# Patient Record
Sex: MALE | Race: WHITE | NOT HISPANIC OR LATINO
[De-identification: names, ages, dates, MRNs, and addresses within clinical notes are randomized per-mention and may not be internally consistent; named-entity substitution may affect disease eponyms.]

---

## 2021-11-08 ENCOUNTER — TELEPHONE (OUTPATIENT)
Dept: SCHEDULING | Age: 31
End: 2021-11-08

## 2021-11-17 ENCOUNTER — OFFICE VISIT (OUTPATIENT)
Dept: SPORTS MEDICINE | Age: 31
End: 2021-11-17

## 2021-11-17 VITALS — HEART RATE: 78 BPM | DIASTOLIC BLOOD PRESSURE: 89 MMHG | HEIGHT: 73 IN | SYSTOLIC BLOOD PRESSURE: 139 MMHG

## 2021-11-17 DIAGNOSIS — M54.42 CHRONIC LEFT-SIDED LOW BACK PAIN WITH LEFT-SIDED SCIATICA: Primary | ICD-10-CM

## 2021-11-17 DIAGNOSIS — G89.29 CHRONIC LEFT-SIDED LOW BACK PAIN WITH LEFT-SIDED SCIATICA: Primary | ICD-10-CM

## 2021-11-17 DIAGNOSIS — M79.652 LEFT THIGH PAIN: ICD-10-CM

## 2021-11-17 PROCEDURE — 72110 X-RAY EXAM L-2 SPINE 4/>VWS: CPT | Performed by: FAMILY MEDICINE

## 2021-11-17 PROCEDURE — 99204 OFFICE O/P NEW MOD 45 MIN: CPT | Performed by: FAMILY MEDICINE

## 2021-11-17 RX ORDER — NAPROXEN 500 MG/1
TABLET ORAL
Qty: 60 TABLET | Refills: 0 | Status: SHIPPED | OUTPATIENT
Start: 2021-11-17

## 2021-11-17 ASSESSMENT — ENCOUNTER SYMPTOMS
STRIDOR: 0
DIARRHEA: 0
SHORTNESS OF BREATH: 0
CONFUSION: 0
CHEST TIGHTNESS: 0
FACIAL ASYMMETRY: 0
VOMITING: 0
DIZZINESS: 0
SPEECH DIFFICULTY: 0
ABDOMINAL PAIN: 0
EYE REDNESS: 0
AGITATION: 0
FEVER: 0
NAUSEA: 0
EYE DISCHARGE: 0
TROUBLE SWALLOWING: 0
CHILLS: 0
COLOR CHANGE: 0

## 2021-11-22 DIAGNOSIS — M54.42 CHRONIC LEFT-SIDED LOW BACK PAIN WITH LEFT-SIDED SCIATICA: Primary | ICD-10-CM

## 2021-11-22 DIAGNOSIS — G89.29 CHRONIC LEFT-SIDED LOW BACK PAIN WITH LEFT-SIDED SCIATICA: Primary | ICD-10-CM

## 2021-11-22 DIAGNOSIS — M79.652 LEFT THIGH PAIN: ICD-10-CM

## 2021-11-29 ENCOUNTER — TELEPHONE (OUTPATIENT)
Dept: SPORTS MEDICINE | Age: 31
End: 2021-11-29

## 2021-11-30 ENCOUNTER — TELEPHONE (OUTPATIENT)
Dept: SCHEDULING | Age: 31
End: 2021-11-30

## 2021-12-01 ENCOUNTER — V-VISIT (OUTPATIENT)
Dept: SPORTS MEDICINE | Age: 31
End: 2021-12-01

## 2021-12-01 DIAGNOSIS — M79.652 LEFT THIGH PAIN: ICD-10-CM

## 2021-12-01 DIAGNOSIS — M54.42 CHRONIC LEFT-SIDED LOW BACK PAIN WITH LEFT-SIDED SCIATICA: Primary | ICD-10-CM

## 2021-12-01 DIAGNOSIS — G89.29 CHRONIC LEFT-SIDED LOW BACK PAIN WITH LEFT-SIDED SCIATICA: Primary | ICD-10-CM

## 2021-12-01 PROCEDURE — 99213 OFFICE O/P EST LOW 20 MIN: CPT | Performed by: FAMILY MEDICINE

## 2022-03-29 ENCOUNTER — TELEPHONE (OUTPATIENT)
Dept: SCHEDULING | Age: 32
End: 2022-03-29

## 2022-04-05 ENCOUNTER — OFFICE VISIT (OUTPATIENT)
Dept: SPORTS MEDICINE | Age: 32
End: 2022-04-05

## 2022-04-05 VITALS — DIASTOLIC BLOOD PRESSURE: 87 MMHG | HEART RATE: 85 BPM | SYSTOLIC BLOOD PRESSURE: 137 MMHG

## 2022-04-05 DIAGNOSIS — G89.29 CHRONIC LEFT-SIDED LOW BACK PAIN WITH LEFT-SIDED SCIATICA: Primary | ICD-10-CM

## 2022-04-05 DIAGNOSIS — G57.92 MONONEURITIS OF LEFT LOWER EXTREMITY: ICD-10-CM

## 2022-04-05 DIAGNOSIS — M79.652 LEFT THIGH PAIN: ICD-10-CM

## 2022-04-05 DIAGNOSIS — M54.42 CHRONIC LEFT-SIDED LOW BACK PAIN WITH LEFT-SIDED SCIATICA: Primary | ICD-10-CM

## 2022-04-05 PROCEDURE — 99213 OFFICE O/P EST LOW 20 MIN: CPT | Performed by: FAMILY MEDICINE

## 2022-04-05 ASSESSMENT — ENCOUNTER SYMPTOMS
CHILLS: 0
DIZZINESS: 0
STRIDOR: 0
COLOR CHANGE: 0
FEVER: 0
TROUBLE SWALLOWING: 0
AGITATION: 0
DIARRHEA: 0
CONFUSION: 0
CHEST TIGHTNESS: 0
EYE REDNESS: 0
EYE DISCHARGE: 0
NAUSEA: 0
ABDOMINAL PAIN: 0
SPEECH DIFFICULTY: 0
VOMITING: 0
FACIAL ASYMMETRY: 0
SHORTNESS OF BREATH: 0

## 2022-04-29 ENCOUNTER — HOSPITAL ENCOUNTER (OUTPATIENT)
Dept: NEUROLOGY | Age: 32
Discharge: HOME | End: 2022-04-29
Attending: FAMILY MEDICINE

## 2022-04-29 DIAGNOSIS — G57.92 MONONEURITIS OF LEFT LOWER EXTREMITY: ICD-10-CM

## 2022-04-29 DIAGNOSIS — M79.652 LEFT THIGH PAIN: ICD-10-CM

## 2022-04-29 PROCEDURE — 95908 NRV CNDJ TST 3-4 STUDIES: CPT

## 2022-04-29 PROCEDURE — 95886 MUSC TEST DONE W/N TEST COMP: CPT | Performed by: PSYCHIATRY & NEUROLOGY

## 2022-04-29 PROCEDURE — 95886 MUSC TEST DONE W/N TEST COMP: CPT

## 2022-04-29 PROCEDURE — 95908 NRV CNDJ TST 3-4 STUDIES: CPT | Performed by: PSYCHIATRY & NEUROLOGY

## 2022-05-10 ENCOUNTER — OFFICE VISIT (OUTPATIENT)
Dept: SPORTS MEDICINE | Age: 32
End: 2022-05-10

## 2022-05-10 VITALS — HEART RATE: 79 BPM | DIASTOLIC BLOOD PRESSURE: 85 MMHG | SYSTOLIC BLOOD PRESSURE: 142 MMHG

## 2022-05-10 DIAGNOSIS — M79.10 TRIGGER POINT OF LEFT SIDE OF BODY: ICD-10-CM

## 2022-05-10 DIAGNOSIS — M79.652 LEFT THIGH PAIN: Primary | ICD-10-CM

## 2022-05-10 DIAGNOSIS — S76.312D HAMSTRING STRAIN, LEFT, SUBSEQUENT ENCOUNTER: ICD-10-CM

## 2022-05-10 PROCEDURE — 20552 NJX 1/MLT TRIGGER POINT 1/2: CPT | Performed by: FAMILY MEDICINE

## 2022-05-10 PROCEDURE — 99213 OFFICE O/P EST LOW 20 MIN: CPT | Performed by: FAMILY MEDICINE

## 2022-05-10 ASSESSMENT — ENCOUNTER SYMPTOMS
COLOR CHANGE: 0
STRIDOR: 0
CHEST TIGHTNESS: 0
FEVER: 0
CONFUSION: 0
SHORTNESS OF BREATH: 0
FACIAL ASYMMETRY: 0
CHILLS: 0
DIZZINESS: 0
ABDOMINAL PAIN: 0
AGITATION: 0
VOMITING: 0
TROUBLE SWALLOWING: 0
EYE REDNESS: 0
EYE DISCHARGE: 0
DIARRHEA: 0
SPEECH DIFFICULTY: 0
NAUSEA: 0

## 2022-06-14 ENCOUNTER — OFFICE VISIT (OUTPATIENT)
Dept: SPORTS MEDICINE | Age: 32
End: 2022-06-14

## 2022-06-14 VITALS — SYSTOLIC BLOOD PRESSURE: 154 MMHG | HEART RATE: 75 BPM | DIASTOLIC BLOOD PRESSURE: 86 MMHG

## 2022-06-14 DIAGNOSIS — M79.652 LEFT THIGH PAIN: Primary | ICD-10-CM

## 2022-06-14 DIAGNOSIS — S76.312D HAMSTRING STRAIN, LEFT, SUBSEQUENT ENCOUNTER: ICD-10-CM

## 2022-06-14 DIAGNOSIS — M79.10 TRIGGER POINT OF LEFT SIDE OF BODY: ICD-10-CM

## 2022-06-14 PROCEDURE — 99213 OFFICE O/P EST LOW 20 MIN: CPT | Performed by: FAMILY MEDICINE

## 2022-06-14 PROCEDURE — 20552 NJX 1/MLT TRIGGER POINT 1/2: CPT | Performed by: FAMILY MEDICINE

## 2022-06-14 ASSESSMENT — ENCOUNTER SYMPTOMS
AGITATION: 0
CHILLS: 0
NAUSEA: 0
TROUBLE SWALLOWING: 0
VOMITING: 0
FACIAL ASYMMETRY: 0
COLOR CHANGE: 0
EYE DISCHARGE: 0
DIZZINESS: 0
SHORTNESS OF BREATH: 0
SPEECH DIFFICULTY: 0
STRIDOR: 0
EYE REDNESS: 0
ABDOMINAL PAIN: 0
DIARRHEA: 0
FEVER: 0
CONFUSION: 0
CHEST TIGHTNESS: 0

## 2022-07-14 ENCOUNTER — OFFICE VISIT (OUTPATIENT)
Dept: SPORTS MEDICINE | Age: 32
End: 2022-07-14

## 2022-07-14 VITALS — SYSTOLIC BLOOD PRESSURE: 143 MMHG | DIASTOLIC BLOOD PRESSURE: 87 MMHG | HEART RATE: 64 BPM

## 2022-07-14 DIAGNOSIS — S76.312D HAMSTRING STRAIN, LEFT, SUBSEQUENT ENCOUNTER: Primary | ICD-10-CM

## 2022-07-14 DIAGNOSIS — M79.10 TRIGGER POINT OF LEFT SIDE OF BODY: ICD-10-CM

## 2022-07-14 PROCEDURE — 20552 NJX 1/MLT TRIGGER POINT 1/2: CPT | Performed by: FAMILY MEDICINE

## 2022-07-14 PROCEDURE — 99213 OFFICE O/P EST LOW 20 MIN: CPT | Performed by: FAMILY MEDICINE

## 2022-07-14 ASSESSMENT — ENCOUNTER SYMPTOMS
STRIDOR: 0
EYE DISCHARGE: 0
COLOR CHANGE: 0
AGITATION: 0
DIARRHEA: 0
FEVER: 0
SPEECH DIFFICULTY: 0
ABDOMINAL PAIN: 0
CHILLS: 0
TROUBLE SWALLOWING: 0
VOMITING: 0
CONFUSION: 0
NAUSEA: 0
EYE REDNESS: 0
CHEST TIGHTNESS: 0
FACIAL ASYMMETRY: 0
SHORTNESS OF BREATH: 0
DIZZINESS: 0

## 2022-08-11 ENCOUNTER — OFFICE VISIT (OUTPATIENT)
Dept: SPORTS MEDICINE | Age: 32
End: 2022-08-11

## 2022-08-11 VITALS — SYSTOLIC BLOOD PRESSURE: 147 MMHG | HEART RATE: 72 BPM | DIASTOLIC BLOOD PRESSURE: 97 MMHG

## 2022-08-11 DIAGNOSIS — M79.10 TRIGGER POINT OF LEFT SIDE OF BODY: ICD-10-CM

## 2022-08-11 DIAGNOSIS — M79.652 LEFT THIGH PAIN: ICD-10-CM

## 2022-08-11 DIAGNOSIS — S76.312D HAMSTRING STRAIN, LEFT, SUBSEQUENT ENCOUNTER: Primary | ICD-10-CM

## 2022-08-11 PROCEDURE — 20552 NJX 1/MLT TRIGGER POINT 1/2: CPT | Performed by: FAMILY MEDICINE

## 2022-08-11 PROCEDURE — 99213 OFFICE O/P EST LOW 20 MIN: CPT | Performed by: FAMILY MEDICINE

## 2022-08-11 ASSESSMENT — ENCOUNTER SYMPTOMS
STRIDOR: 0
VOMITING: 0
SHORTNESS OF BREATH: 0
CHILLS: 0
TROUBLE SWALLOWING: 0
SPEECH DIFFICULTY: 0
FACIAL ASYMMETRY: 0
DIZZINESS: 0
FEVER: 0
AGITATION: 0
EYE DISCHARGE: 0
ABDOMINAL PAIN: 0
EYE REDNESS: 0
CHEST TIGHTNESS: 0
CONFUSION: 0
NAUSEA: 0
DIARRHEA: 0
COLOR CHANGE: 0

## 2022-08-11 ASSESSMENT — PAIN SCALES - GENERAL: PAINLEVEL: 1

## 2022-09-02 ENCOUNTER — APPOINTMENT (OUTPATIENT)
Dept: URBAN - METROPOLITAN AREA CLINIC 314 | Age: 32
Setting detail: DERMATOLOGY
End: 2022-09-08

## 2022-09-02 DIAGNOSIS — D22 MELANOCYTIC NEVI: ICD-10-CM

## 2022-09-02 DIAGNOSIS — L81.4 OTHER MELANIN HYPERPIGMENTATION: ICD-10-CM

## 2022-09-02 DIAGNOSIS — Q826 OTHER SPECIFIED ANOMALIES OF SKIN: ICD-10-CM

## 2022-09-02 DIAGNOSIS — L90.8 OTHER ATROPHIC DISORDERS OF SKIN: ICD-10-CM

## 2022-09-02 DIAGNOSIS — Q819 OTHER SPECIFIED ANOMALIES OF SKIN: ICD-10-CM

## 2022-09-02 DIAGNOSIS — Q828 OTHER SPECIFIED ANOMALIES OF SKIN: ICD-10-CM

## 2022-09-02 DIAGNOSIS — L90.5 SCAR CONDITIONS AND FIBROSIS OF SKIN: ICD-10-CM

## 2022-09-02 DIAGNOSIS — Z12.83 ENCOUNTER FOR SCREENING FOR MALIGNANT NEOPLASM OF SKIN: ICD-10-CM

## 2022-09-02 DIAGNOSIS — L91.8 OTHER HYPERTROPHIC DISORDERS OF THE SKIN: ICD-10-CM

## 2022-09-02 DIAGNOSIS — D18.0 HEMANGIOMA: ICD-10-CM

## 2022-09-02 PROBLEM — L85.8 OTHER SPECIFIED EPIDERMAL THICKENING: Status: ACTIVE | Noted: 2022-09-02

## 2022-09-02 PROBLEM — D18.01 HEMANGIOMA OF SKIN AND SUBCUTANEOUS TISSUE: Status: ACTIVE | Noted: 2022-09-02

## 2022-09-02 PROBLEM — D22.5 MELANOCYTIC NEVI OF TRUNK: Status: ACTIVE | Noted: 2022-09-02

## 2022-09-02 PROBLEM — D22.4 MELANOCYTIC NEVI OF SCALP AND NECK: Status: ACTIVE | Noted: 2022-09-02

## 2022-09-02 PROCEDURE — 99203 OFFICE O/P NEW LOW 30 MIN: CPT

## 2022-09-02 PROCEDURE — OTHER COUNSELING: OTHER

## 2022-09-02 PROCEDURE — OTHER EDUCATIONAL RESOURCES PROVIDED: OTHER

## 2022-09-02 PROCEDURE — OTHER SKIN TAG REMOVAL (COSMETIC): OTHER

## 2022-09-02 PROCEDURE — OTHER MIPS QUALITY: OTHER

## 2022-09-02 PROCEDURE — OTHER ADDITIONAL NOTES: OTHER

## 2022-09-02 ASSESSMENT — LOCATION DETAILED DESCRIPTION DERM
LOCATION DETAILED: SUPERIOR THORACIC SPINE
LOCATION DETAILED: LEFT AXILLARY VAULT
LOCATION DETAILED: NASAL TIP
LOCATION DETAILED: LEFT ANTERIOR PROXIMAL THIGH
LOCATION DETAILED: RIGHT ANTERIOR SHOULDER
LOCATION DETAILED: STERNUM
LOCATION DETAILED: LEFT ANTERIOR SHOULDER
LOCATION DETAILED: RIGHT MEDIAL FOREHEAD
LOCATION DETAILED: LEFT SUPERIOR ANTERIOR NECK
LOCATION DETAILED: RIGHT ANTERIOR PROXIMAL THIGH

## 2022-09-02 ASSESSMENT — LOCATION ZONE DERM
LOCATION ZONE: NECK
LOCATION ZONE: LEG
LOCATION ZONE: NOSE
LOCATION ZONE: AXILLAE
LOCATION ZONE: TRUNK
LOCATION ZONE: FACE
LOCATION ZONE: ARM

## 2022-09-02 ASSESSMENT — LOCATION SIMPLE DESCRIPTION DERM
LOCATION SIMPLE: LEFT AXILLARY VAULT
LOCATION SIMPLE: RIGHT FOREHEAD
LOCATION SIMPLE: LEFT THIGH
LOCATION SIMPLE: NOSE
LOCATION SIMPLE: RIGHT THIGH
LOCATION SIMPLE: LEFT SHOULDER
LOCATION SIMPLE: UPPER BACK
LOCATION SIMPLE: RIGHT SHOULDER
LOCATION SIMPLE: CHEST
LOCATION SIMPLE: LEFT ANTERIOR NECK

## 2022-09-02 NOTE — PROCEDURE: SKIN TAG REMOVAL (COSMETIC)
Anesthesia Volume In Cc: 0.5
Consent: Written consent obtained and the risks of skin tag removal was reviewed with the patient including but not limited to bleeding, pigmentary change, infection, pain, and remote possibility of scarring.
Hemostasis: Aluminum Chloride
Price (Use Numbers Only, No Special Characters Or $): 0
Anesthesia Type: 1% lidocaine with epinephrine
Removed With: gradle excision
Detail Level: Detailed
Use Enhanced Medication Counseling?: No

## 2022-09-02 NOTE — PROCEDURE: ADDITIONAL NOTES
Detail Level: Simple
Additional Notes: -\\nCounseled pt about micro needling induces inflammation, but would provide much clearance of scars and there could be a possibility of making scars worse.\\n-\\nLaser resurfacing would provide a higher chance of improvement with scars.\\nDr. Stalin Castanon’s Office Information was provided to patient.
Render Risk Assessment In Note?: no

## 2022-12-12 ENCOUNTER — APPOINTMENT (OUTPATIENT)
Dept: URBAN - METROPOLITAN AREA CLINIC 314 | Age: 32
Setting detail: DERMATOLOGY
End: 2022-12-15

## 2022-12-12 DIAGNOSIS — B07.8 OTHER VIRAL WARTS: ICD-10-CM

## 2022-12-12 DIAGNOSIS — Z00.00 ENCOUNTER FOR GENERAL ADULT MEDICAL EXAMINATION WITHOUT ABNORMAL FINDINGS: ICD-10-CM

## 2022-12-12 PROCEDURE — 17110 DESTRUCT B9 LESION 1-14: CPT

## 2022-12-12 PROCEDURE — OTHER COUNSELING: OTHER

## 2022-12-12 PROCEDURE — 99212 OFFICE O/P EST SF 10 MIN: CPT | Mod: 25

## 2022-12-12 PROCEDURE — OTHER LIQUID NITROGEN: OTHER

## 2022-12-12 ASSESSMENT — LOCATION ZONE DERM
LOCATION ZONE: PENIS
LOCATION ZONE: FINGER

## 2022-12-12 ASSESSMENT — LOCATION DETAILED DESCRIPTION DERM
LOCATION DETAILED: RIGHT LATERAL MID-DORSAL PENILE SHAFT
LOCATION DETAILED: LEFT DISTAL RADIAL PALMAR INDEX FINGER

## 2022-12-12 ASSESSMENT — LOCATION SIMPLE DESCRIPTION DERM
LOCATION SIMPLE: DORSAL PENIS
LOCATION SIMPLE: LEFT INDEX FINGER

## 2022-12-12 NOTE — PROCEDURE: LIQUID NITROGEN
Medical Necessity Information: It is in your best interest to select a reason for this procedure from the list below. All of these items fulfill various CMS LCD requirements except the new and changing color options.
Show Topical Anesthesia Variable?: Yes
Render Post-Care Instructions In Note?: no
Spray Paint Text: The liquid nitrogen was applied to the skin utilizing a spray paint frosting technique.
Consent: The patient's consent was obtained including but not limited to risks of crusting, scabbing, blistering, scarring, darker or lighter pigmentary change, recurrence, incomplete removal and infection.
Post-Care Instructions: I reviewed with the patient in detail post-care instructions. Patient is to wear sunprotection, and avoid picking at any of the treated lesions. Pt may apply Vaseline to crusted or scabbing areas.
Medical Necessity Clause: This procedure was medically necessary because the lesions that were treated were:
Detail Level: Detailed

## 2023-01-12 ENCOUNTER — APPOINTMENT (OUTPATIENT)
Dept: URBAN - METROPOLITAN AREA CLINIC 314 | Age: 33
Setting detail: DERMATOLOGY
End: 2023-01-13

## 2023-01-12 DIAGNOSIS — L64.8 OTHER ANDROGENIC ALOPECIA: ICD-10-CM

## 2023-01-12 DIAGNOSIS — B07.8 OTHER VIRAL WARTS: ICD-10-CM

## 2023-01-12 PROCEDURE — OTHER PRESCRIPTION MEDICATION MANAGEMENT: OTHER

## 2023-01-12 PROCEDURE — OTHER LIQUID NITROGEN: OTHER

## 2023-01-12 PROCEDURE — OTHER COUNSELING: OTHER

## 2023-01-12 PROCEDURE — OTHER PRESCRIPTION: OTHER

## 2023-01-12 PROCEDURE — OTHER EDUCATIONAL RESOURCES PROVIDED: OTHER

## 2023-01-12 PROCEDURE — 17110 DESTRUCT B9 LESION 1-14: CPT

## 2023-01-12 PROCEDURE — 99214 OFFICE O/P EST MOD 30 MIN: CPT | Mod: 25

## 2023-01-12 RX ORDER — FINASTERIDE 1 MG/1
TABLET, FILM COATED ORAL QD
Qty: 180 | Refills: 1 | Status: ERX | COMMUNITY
Start: 2023-01-12

## 2023-01-12 ASSESSMENT — LOCATION DETAILED DESCRIPTION DERM
LOCATION DETAILED: RIGHT LATERAL MID-DORSAL PENILE SHAFT
LOCATION DETAILED: POSTERIOR MID-PARIETAL SCALP

## 2023-01-12 ASSESSMENT — LOCATION SIMPLE DESCRIPTION DERM
LOCATION SIMPLE: DORSAL PENIS
LOCATION SIMPLE: POSTERIOR SCALP

## 2023-01-12 ASSESSMENT — LOCATION ZONE DERM
LOCATION ZONE: SCALP
LOCATION ZONE: PENIS

## 2023-01-12 NOTE — PROCEDURE: PRESCRIPTION MEDICATION MANAGEMENT
Detail Level: Zone
Render In Strict Bullet Format?: No
Initiate Treatment: finasteride 1 mg tablet QD

## 2023-01-12 NOTE — HPI: SKIN LESIONS
Is This A New Presentation, Or A Follow-Up?: Follow Up Skin Lesions
Additional History: Patient is following up for this site that has improved slightly since his last visit. He is uncertain if it is a wart.

## 2023-01-12 NOTE — PROCEDURE: LIQUID NITROGEN
Include Z78.9 (Other Specified Conditions Influencing Health Status) As An Associated Diagnosis?: No
Medical Necessity Information: It is in your best interest to select a reason for this procedure from the list below. All of these items fulfill various CMS LCD requirements except the new and changing color options.
Post-Care Instructions: I reviewed with the patient in detail post-care instructions. Patient is to wear sunprotection, and avoid picking at any of the treated lesions. Pt may apply Vaseline to crusted or scabbing areas.
Show Applicator Variable?: Yes
Consent: The patient's consent was obtained including but not limited to risks of crusting, scabbing, blistering, scarring, darker or lighter pigmentary change, recurrence, incomplete removal and infection.
Detail Level: Detailed
Spray Paint Text: The liquid nitrogen was applied to the skin utilizing a spray paint frosting technique.
Medical Necessity Clause: This procedure was medically necessary because the lesions that were treated were:

## 2023-07-18 ENCOUNTER — APPOINTMENT (OUTPATIENT)
Dept: URBAN - METROPOLITAN AREA CLINIC 314 | Age: 33
Setting detail: DERMATOLOGY
End: 2023-07-24

## 2023-07-18 DIAGNOSIS — L64.8 OTHER ANDROGENIC ALOPECIA: ICD-10-CM

## 2023-07-18 PROCEDURE — OTHER PRESCRIPTION: OTHER

## 2023-07-18 PROCEDURE — OTHER PRESCRIPTION MEDICATION MANAGEMENT: OTHER

## 2023-07-18 PROCEDURE — 99213 OFFICE O/P EST LOW 20 MIN: CPT

## 2023-07-18 PROCEDURE — OTHER COUNSELING: OTHER

## 2023-07-18 RX ORDER — FINASTERIDE 1 MG/1
TABLET, FILM COATED ORAL QD
Qty: 180 | Refills: 1 | Status: ERX

## 2023-07-18 ASSESSMENT — LOCATION ZONE DERM: LOCATION ZONE: SCALP

## 2023-07-18 ASSESSMENT — LOCATION SIMPLE DESCRIPTION DERM: LOCATION SIMPLE: POSTERIOR SCALP

## 2023-07-18 ASSESSMENT — LOCATION DETAILED DESCRIPTION DERM: LOCATION DETAILED: POSTERIOR MID-PARIETAL SCALP

## 2023-07-18 NOTE — PROCEDURE: PRESCRIPTION MEDICATION MANAGEMENT
Detail Level: Zone
Render In Strict Bullet Format?: No
Plan: Patient did disclose some reduction in ejaculate volume, no other sexual SE's or depression. Would like to continue finasteride.
Continue Regimen: finasteride 1 mg tablet QD

## 2023-12-05 ENCOUNTER — APPOINTMENT (OUTPATIENT)
Dept: URBAN - METROPOLITAN AREA CLINIC 314 | Age: 33
Setting detail: DERMATOLOGY
End: 2023-12-06

## 2023-12-05 DIAGNOSIS — L64.8 OTHER ANDROGENIC ALOPECIA: ICD-10-CM

## 2023-12-05 DIAGNOSIS — K13.0 DISEASES OF LIPS: ICD-10-CM

## 2023-12-05 PROCEDURE — OTHER PRESCRIPTION MEDICATION MANAGEMENT: OTHER

## 2023-12-05 PROCEDURE — 99213 OFFICE O/P EST LOW 20 MIN: CPT

## 2023-12-05 PROCEDURE — OTHER PRESCRIPTION: OTHER

## 2023-12-05 PROCEDURE — OTHER ADDITIONAL NOTES: OTHER

## 2023-12-05 PROCEDURE — OTHER COUNSELING: OTHER

## 2023-12-05 RX ORDER — HYDROCORTISONE 25 MG/G
OINTMENT TOPICAL BID
Qty: 28.35 | Refills: 3 | Status: ERX | COMMUNITY
Start: 2023-12-05

## 2023-12-05 ASSESSMENT — LOCATION DETAILED DESCRIPTION DERM
LOCATION DETAILED: LEFT SUPERIOR MEDIAL BUCCAL CHEEK
LOCATION DETAILED: POSTERIOR MID-PARIETAL SCALP
LOCATION DETAILED: RIGHT SUPERIOR MEDIAL BUCCAL CHEEK

## 2023-12-05 ASSESSMENT — LOCATION SIMPLE DESCRIPTION DERM
LOCATION SIMPLE: POSTERIOR SCALP
LOCATION SIMPLE: LEFT CHEEK
LOCATION SIMPLE: RIGHT CHEEK

## 2023-12-05 ASSESSMENT — LOCATION ZONE DERM
LOCATION ZONE: FACE
LOCATION ZONE: SCALP

## 2023-12-05 NOTE — PROCEDURE: PRESCRIPTION MEDICATION MANAGEMENT
Render In Strict Bullet Format?: No
Detail Level: Zone
Initiate Treatment: Hydrocortisone ointment
Modify Regimen: Decrease finasteride QD 1 mg to 0.5 mg tablet QD
Plan: Patient disclosed reduction in ejaculate volume, no other sexual SE's or depression. Would like to continue finasteride but cut dosage in half.

## 2023-12-05 NOTE — PROCEDURE: ADDITIONAL NOTES
Additional Notes: Patient had decreased ejaculation volume from finasteride so informed can split pill in half with pill cutter
Render Risk Assessment In Note?: no
Detail Level: Simple

## 2025-01-15 RX ORDER — FINASTERIDE 1 MG/1
TABLET, FILM COATED ORAL QD
Qty: 30 | Refills: 0 | Status: ERX

## 2025-02-03 ENCOUNTER — APPOINTMENT (OUTPATIENT)
Dept: URBAN - METROPOLITAN AREA CLINIC 314 | Age: 35
Setting detail: DERMATOLOGY
End: 2025-02-03

## 2025-02-03 DIAGNOSIS — L64.8 OTHER ANDROGENIC ALOPECIA: ICD-10-CM

## 2025-02-03 PROCEDURE — 99213 OFFICE O/P EST LOW 20 MIN: CPT

## 2025-02-03 PROCEDURE — OTHER COUNSELING: OTHER

## 2025-02-03 PROCEDURE — OTHER PRESCRIPTION: OTHER

## 2025-02-03 PROCEDURE — OTHER ADDITIONAL NOTES: OTHER

## 2025-02-03 PROCEDURE — OTHER MIPS QUALITY: OTHER

## 2025-02-03 PROCEDURE — OTHER PRESCRIPTION MEDICATION MANAGEMENT: OTHER

## 2025-02-03 RX ORDER — FINASTERIDE 1 MG/1
TABLET, FILM COATED ORAL QD
Qty: 45 | Refills: 3 | Status: ERX | COMMUNITY
Start: 2025-02-03

## 2025-02-03 ASSESSMENT — LOCATION DETAILED DESCRIPTION DERM: LOCATION DETAILED: POSTERIOR MID-PARIETAL SCALP

## 2025-02-03 ASSESSMENT — LOCATION ZONE DERM: LOCATION ZONE: SCALP

## 2025-02-03 ASSESSMENT — LOCATION SIMPLE DESCRIPTION DERM: LOCATION SIMPLE: POSTERIOR SCALP

## 2025-02-03 NOTE — PROCEDURE: PRESCRIPTION MEDICATION MANAGEMENT
Detail Level: Zone
Render In Strict Bullet Format?: No
Plan: Patient disclosed reduction in ejaculate volume at full 1 mg dosage. This has since discontinued at the 0.5 mg dosage.\\nPatient was counseled on minoxidil, which can be considered as needed.
Continue Regimen: finasteride 0.5 mg tablet QD